# Patient Record
Sex: FEMALE | Race: WHITE | NOT HISPANIC OR LATINO | Employment: FULL TIME | URBAN - NONMETROPOLITAN AREA
[De-identification: names, ages, dates, MRNs, and addresses within clinical notes are randomized per-mention and may not be internally consistent; named-entity substitution may affect disease eponyms.]

---

## 2022-06-15 RX ORDER — MELOXICAM 7.5 MG/1
TABLET ORAL
Qty: 45 TABLET | Refills: 1 | Status: SHIPPED | OUTPATIENT
Start: 2022-06-15

## 2023-01-16 ENCOUNTER — TELEPHONE (OUTPATIENT)
Dept: FAMILY MEDICINE CLINIC | Facility: CLINIC | Age: 58
End: 2023-01-16

## 2023-01-16 NOTE — TELEPHONE ENCOUNTER
Caller: Amaya Medina    Relationship: Self    Best call back number: [phone]    What is the medical concern/diagnosis:   ROUTINE COLONOSCOPY     What specialty or service is being requested:   GASTROLOGY     What is the provider, practice or medical service name:     [facility]  [address] [place] [address]  PHONE: [phone]    Any additional details:   PATIENT STATED THAT SHE HAD HER LAST COLONOSCOPY ON 09/18/2017 AND HAD POLYP'S REMOVED AND WOULD LIKE FOR A REFERRAL TO BE PLACED TO HAVE A ROUTINE COLOSCOPY DONE  THAT SHE IS OVER DUE FOR

## 2023-01-17 DIAGNOSIS — Z12.11 SCREENING FOR COLON CANCER: Primary | ICD-10-CM
